# Patient Record
(demographics unavailable — no encounter records)

---

## 2025-07-23 NOTE — PHYSICAL EXAM

## 2025-07-23 NOTE — HISTORY OF PRESENT ILLNESS
[FreeTextEntry7] : 15 YR [facility]  [FreeTextEntry1] : Here for annual exam, brought in by parent Lives with parents Attends school doing well Has friends. Describes mood as good. Participates in  Consumes variety of foods. Denies alcohol, drug, cigarette use Sleeps well   Goes to dentist  CONCERNS: heavy periods menarche 4 years ago

## 2025-07-23 NOTE — DISCUSSION/SUMMARY
[FreeTextEntry1] : to peds gyn if they don't do labs call me Continue balanced diet with all food groups. Brush teeth twice a day with toothbrush. Recommend visit to dentist. Maintain consistent daily routines and sleep schedule. Personal hygiene, puberty, and sexual health reviewed. Risky behaviors assessed. School discussed. Limit screen time to no more than 2 hours per day. Encourage physical activity. CLEARED FOR SPORTS PARTICIPATION f/u next [facility] in 1 year